# Patient Record
(demographics unavailable — no encounter records)

---

## 2025-01-30 NOTE — PROCEDURE
[Amenorrhea] : Amenorrhea [Suspected Polycystic Ovaries] : suspected polycystic ovaries [Transvaginal Ultrasound] : transvaginal ultrasound [Anteverted] : anteverted [L: ___ cm] : L: [unfilled] cm [H: ___ cm] : H: [unfilled] cm [FreeTextEntry5] : Endometrial thickness 12.2 mm [FreeTextEntry7] : 2.4 x 3.9 cm [FreeTextEntry8] : 2.5 x 3.1 cm [FreeTextEntry6] : Bilateral ovaries contain multiple subcentimeter cysts

## 2025-01-30 NOTE — HISTORY OF PRESENT ILLNESS
[FreeTextEntry1] : Patient is 30 years old para 0-0-1-0 last menstrual period October 2024 Urine pregnancy test is noted to be negative Patient has a history of PCOS and elevated testosterone.  She is status post evaluation by medical endocrinologist and is on metformin 500 mg p.o. twice daily.

## 2025-01-30 NOTE — DISCUSSION/SUMMARY
[FreeTextEntry1] : Amenorrhea discussed with patient including possible etiologies, diagnostic and treatment options PCOS discussed with patient Weight loss options discussed with patient Patient states that she will seek treatment for weight reduction in the form of semaglutide Follow-up 4-6 months or as needed

## 2025-01-30 NOTE — PHYSICAL EXAM
[Chaperone Present] : A chaperone was present in the examining room during all aspects of the physical examination [FreeTextEntry2] : Annabelle [Appropriately responsive] : appropriately responsive [Alert] : alert [No Acute Distress] : no acute distress [No Lymphadenopathy] : no lymphadenopathy [Regular Rate Rhythm] : regular rate rhythm [No Murmurs] : no murmurs [Clear to Auscultation B/L] : clear to auscultation bilaterally [Soft] : soft [Non-tender] : non-tender [Non-distended] : non-distended [No HSM] : No HSM [No Lesions] : no lesions [No Mass] : no mass [Oriented x3] : oriented x3 [Labia Majora] : normal [Labia Minora] : normal [Normal] : normal [Uterine Adnexae] : normal

## 2025-04-25 NOTE — PHYSICAL EXAM
[MA] : MA [FreeTextEntry2] : Annabelle [Appropriately responsive] : appropriately responsive [Alert] : alert [No Acute Distress] : no acute distress [No Lymphadenopathy] : no lymphadenopathy [Regular Rate Rhythm] : regular rate rhythm [No Murmurs] : no murmurs [Clear to Auscultation B/L] : clear to auscultation bilaterally [Soft] : soft [Non-tender] : non-tender [Non-distended] : non-distended [No HSM] : No HSM [No Lesions] : no lesions [No Mass] : no mass [Oriented x3] : oriented x3 [Examination Of The Breasts] : a normal appearance [No Masses] : no breast masses were palpable [Labia Majora] : normal [Labia Minora] : normal [Normal] : normal [Uterine Adnexae] : normal

## 2025-04-25 NOTE — PROCEDURE
[Cervical Pap Smear] : cervical Pap smear [Liquid Base] : liquid base [Vulvar Biopsy] : Vulvar Biopsy [Time out performed] : Pre-procedure time out performed.  Patient's name, date of birth and procedure confirmed. [Consent Obtained] : Consent obtained [] : in the Perineum [Size of Biopsy Taken: ___ (mm)] : [unfilled]Umm [Local Anesthesia] : local anesthesia [____ Lidocaine w/o Epi] : ~VmL lidocaine without epinephrine [Betadine] : Betadine [Sent to Pathology] : placed in buffered formalin and sent for pathology [Scalpel] : scalpel [Silver Nitrate] : silver nitrate [Tolerated Well] : the patient tolerated the procedure well [No Complications] : there were no complications

## 2025-04-25 NOTE — DISCUSSION/SUMMARY
[FreeTextEntry1] : Pap done Self breast exam stressed Follow-up with bariatric specialist as scheduled Will follow-up results of biopsy Issues regarding amenorrhea discussed with patient If no menses in 3 to 4 months we will consider withdrawal bleeding Follow-up 6 months or as needed

## 2025-04-25 NOTE — HISTORY OF PRESENT ILLNESS
[FreeTextEntry1] : Patient is 30 years old para 0-0-1-0 last menstrual period February 14, 2025 She has a history of PCOS and is presently on metformin 1000 mg twice daily Patient states that she is now being followed by bariatric specialist for possible treatment with Wegovy Patient noted skin tag-like lesion on perineum

## 2025-05-03 NOTE — HISTORY OF PRESENT ILLNESS
[de-identified] : Referred by: Joe John   HPI: 30 year old female here for her initial medical weight loss appointment. PMHx is significant for class 3 obesity, PHI, PCOS, GERD, migraines, insulin resistance, dyslipidemia, and metabolic syndrome.    Weight History: Highest Weight: 265 lbs Lowest Weight: 230 lbs Current Weight: 262 lbs/47.16 kg/m2   Previous Weight Loss Efforts: Has struggled with her weight for years. Was diagnosed with PCOS when she was 16. Was started on metformin at that time. She cannot recall if she initially lost weight when starting. ~ 2 weeks ago, reports that her dose was increased from 500 BID to 1000 BID. Since increasing her dose, she reports slight appetite suppression and some weight loss.    Reports that she was at her max weight when she was working the o/n shift.   PMHx is also notable for left breast mass s/p lumpectomy - mass was benign  Has tried several diets in the past with minimal long tern success. Diets include: WWs Low fat Low Carb IF   Previous use of AOMs: Metformin as above in the HPI Medications that may have contributed to weight gain: Has required Provera to initiate her menses q3 months. Denies any associated weight gain.    Pertinent Labs: uploaded into August 2024 - notable for elevated lipids - does not wish to start a statin   Review of Systems: denies pain SOB with exertion Ankle swelling especially before getting Provera to bring down her period Occ HAs denies numbness and tingling GERD regular BMs   Eating behaviors: Sweet cravings > chips - cravings are greatest in the evening Denies emotional/stress or bored eating    24 Hr Recall: B: eggs +/- toast +fruit L: salad vs protein + vegetable D: met + vegetable + roast potatoe Hemalatha: water, snapple, 1-2 cans of soda/week, 3-4 cups of coffee w/Chobani creamer per week   Movement: Walks Most activity associated with work Does have a gym membership - does not routinely use   SocHx: 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 Stopped using ~ 5 yrs ago as she considers having children.  Periods are initiated by Provera q 3 mos Endoscopy UTD

## 2025-05-03 NOTE — HISTORY OF PRESENT ILLNESS
[de-identified] : Referred by: Joe John   HPI: 30 year old female here for her initial medical weight loss appointment. PMHx is significant for class 3 obesity, PHI, PCOS, GERD, migraines, insulin resistance, dyslipidemia, and metabolic syndrome.    Weight History: Highest Weight: 265 lbs Lowest Weight: 230 lbs Current Weight: 262 lbs/47.16 kg/m2   Previous Weight Loss Efforts: Has struggled with her weight for years. Was diagnosed with PCOS when she was 16. Was started on metformin at that time. She cannot recall if she initially lost weight when starting. ~ 2 weeks ago, reports that her dose was increased from 500 BID to 1000 BID. Since increasing her dose, she reports slight appetite suppression and some weight loss.    Reports that she was at her max weight when she was working the o/n shift.   PMHx is also notable for left breast mass s/p lumpectomy - mass was benign  Has tried several diets in the past with minimal long tern success. Diets include: WWs Low fat Low Carb IF   Previous use of AOMs: Metformin as above in the HPI Medications that may have contributed to weight gain: Has required Provera to initiate her menses q3 months. Denies any associated weight gain.    Pertinent Labs: uploaded into August 2024 - notable for elevated lipids - does not wish to start a statin   Review of Systems: denies pain SOB with exertion Ankle swelling especially before getting Provera to bring down her period Occ HAs denies numbness and tingling GERD regular BMs   Eating behaviors: Sweet cravings > chips - cravings are greatest in the evening Denies emotional/stress or bored eating    24 Hr Recall: B: eggs +/- toast +fruit L: salad vs protein + vegetable D: met + vegetable + roast potatoe Hemalatha: water, snapple, 1-2 cans of soda/week, 3-4 cups of coffee w/Chobani creamer per week   Movement: Walks Most activity associated with work Does have a gym membership - does not routinely use   SocHx: 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 Stopped using ~ 5 yrs ago as she considers having children.  Periods are initiated by Provera q 3 mos Endoscopy UTD

## 2025-05-03 NOTE — ASSESSMENT
[FreeTextEntry1] :  A/P: 30 year old female here for her initial medical weight loss appointment. PMHx is significant for class 3 obesity, PHI, PCOS, GERD, migraines, insulin resistance, dyslipidemia, and metabolic syndrome.    -Will work together with an interdisciplinary team to help achieve their weight loss goals. -Will connect with RD to help optimize nutrition and to increase protein as tolerated. Has joined the [facility]. PCOS - continue metformin 1000 mg BID GERD - continue omeprazole. Given increasing severity - recommend that she f/u with GI Will try to get approval for Zepbound.  Will start with 2.5 mg sc once weekly x 4 weeks and then increase to 5 mg sc weekly thereafter. Discussed possible side effects, including constipation, worsening reflux, gastroparesis and low risk of hypoglycemia, pain at injection site. Also discussed possible inflammation of their pancreas, low risk, but can occur. No personal or family history of medullary thyroid cancer. Rx sent. Zepbound is also a treatment for PHI.  PHI - has not had a sleep study in > 10 yrs. Will order a home diagnostic test.  -Encouraged her to increase her physical activity as tolerated.  Due to the muscle wasting effect of GLP-1 Miah. Strongly encouraged that she start resistance/weight training to help maintain and mitigate the muscle wasting effect of this class of medication. Goal: Walk the perimeter of the hospital daily and go to the gym 1x/week Had a long discussion about the importance of contraception while pursuing weight loss efforts. Consider barrier contraception if does not wish to use hormonal.    F/U with RD first available F/U with MD in ~6-8 weeks   Patient was seen by Dr. Heredia on 5/2/25. Time spent with patient was greater than 65 minutes, including chart review, time spent with patient, and charting.

## 2025-05-03 NOTE — PHYSICAL EXAM
[No Rash or Lesion] : No rash or lesion [Alert] : alert [Oriented to Person] : oriented to person [Oriented to Place] : oriented to place [Oriented to Time] : oriented to time [Calm] : calm [de-identified] :  Well-appearing female in NAD [de-identified] :  CNs II-XII grossly intact [de-identified] :  No increased WOB

## 2025-05-03 NOTE — PHYSICAL EXAM
[No Rash or Lesion] : No rash or lesion [Alert] : alert [Oriented to Person] : oriented to person [Oriented to Place] : oriented to place [Oriented to Time] : oriented to time [Calm] : calm [de-identified] :  Well-appearing female in NAD [de-identified] :  CNs II-XII grossly intact [de-identified] :  No increased WOB

## 2025-05-03 NOTE — ASSESSMENT
[FreeTextEntry1] :  A/P: 30 year old female here for her initial medical weight loss appointment. PMHx is significant for class 3 obesity, PHI, PCOS, GERD, migraines, insulin resistance, dyslipidemia, and metabolic syndrome.    -Will work together with an interdisciplinary team to help achieve their weight loss goals. -Will connect with RD to help optimize nutrition and to increase protein as tolerated. Has joined the [facility]. PCOS - continue metformin 1000 mg BID GERD - continue omeprazole. Given increasing severity - recommend that she f/u with GI Will try to get approval for Zepbound.  Will start with 2.5 mg sc once weekly x 4 weeks and then increase to 5 mg sc weekly thereafter. Discussed possible side effects, including constipation, worsening reflux, gastroparesis and low risk of hypoglycemia, pain at injection site. Also discussed possible inflammation of their pancreas, low risk, but can occur. No personal or family history of medullary thyroid cancer. Rx sent. Zepbound is also a treatment for PHI.  PHI - has not had a sleep study in > 10 yrs. Will order a home diagnostic test.  -Encouraged her to increase her physical activity as tolerated.  Due to the muscle wasting effect of GLP-1 Miah. Strongly encouraged that she start resistance/weight training to help maintain and mitigate the muscle wasting effect of this class of medication. Goal: Walk the perimeter of the hospital daily and go to the gym 1x/week Had a long discussion about the importance of contraception while pursuing weight loss efforts. Consider barrier contraception if does not wish to use hormonal.    F/U with RD first available F/U with MD in ~6-8 weeks   Patient was seen by Dr. Heredia on 5/2/25. Time spent with patient was greater than 65 minutes, including chart review, time spent with patient, and charting.

## 2025-05-20 NOTE — HISTORY OF PRESENT ILLNESS
[FreeTextEntry1] : Patient is 30 years old para 0-0-1-0 last menstrual period February 14, 2025 (patient is late for menses x 3 months) Patient started Zepbound 1 week ago for weight reduction

## 2025-05-20 NOTE — DISCUSSION/SUMMARY
[FreeTextEntry1] : Issues regarding amenorrhea/irregular menses/BMI=47.37 discussed with patient Prescribed hormone profile Prescribed pelvic ultrasound Urine culture sent Contraceptive options discussed Will withdrawal her menses after review of labs and pelvic ultrasound

## 2025-05-20 NOTE — PHYSICAL EXAM
[MA] : MA [FreeTextEntry2] : CHILANGO [Appropriately responsive] : appropriately responsive [Alert] : alert [No Acute Distress] : no acute distress [No Lymphadenopathy] : no lymphadenopathy [Regular Rate Rhythm] : regular rate rhythm [No Murmurs] : no murmurs [Clear to Auscultation B/L] : clear to auscultation bilaterally [Soft] : soft [Non-tender] : non-tender [Non-distended] : non-distended [No HSM] : No HSM [No Lesions] : no lesions [No Mass] : no mass [Oriented x3] : oriented x3